# Patient Record
Sex: FEMALE | Race: WHITE | NOT HISPANIC OR LATINO | Employment: OTHER | URBAN - METROPOLITAN AREA
[De-identification: names, ages, dates, MRNs, and addresses within clinical notes are randomized per-mention and may not be internally consistent; named-entity substitution may affect disease eponyms.]

---

## 2022-05-21 ENCOUNTER — HOSPITAL ENCOUNTER (EMERGENCY)
Facility: HOSPITAL | Age: 87
Discharge: HOME | End: 2022-05-21
Attending: EMERGENCY MEDICINE | Admitting: EMERGENCY MEDICINE

## 2022-05-21 ENCOUNTER — APPOINTMENT (OUTPATIENT)
Dept: CT IMAGING | Facility: HOSPITAL | Age: 87
End: 2022-05-21

## 2022-05-21 VITALS
SYSTOLIC BLOOD PRESSURE: 145 MMHG | HEART RATE: 81 BPM | RESPIRATION RATE: 18 BRPM | HEIGHT: 61 IN | WEIGHT: 145 LBS | DIASTOLIC BLOOD PRESSURE: 71 MMHG | BODY MASS INDEX: 27.38 KG/M2 | OXYGEN SATURATION: 97 % | TEMPERATURE: 98.2 F

## 2022-05-21 DIAGNOSIS — S06.0X0A CONCUSSION WITHOUT LOSS OF CONSCIOUSNESS, INITIAL ENCOUNTER: Primary | ICD-10-CM

## 2022-05-21 DIAGNOSIS — N39.0 URINARY TRACT INFECTION WITHOUT HEMATURIA, SITE UNSPECIFIED: ICD-10-CM
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
## 2022-05-21 LAB
ANION GAP SERPL CALCULATED.3IONS-SCNC: 11 MMOL/L (ref 5–15)
BACTERIA UR QL AUTO: ABNORMAL /HPF
BASOPHILS # BLD AUTO: 0 10*3/MM3 (ref 0–0.2)
BASOPHILS NFR BLD AUTO: 1.2 % (ref 0–1.5)
BILIRUB UR QL STRIP: NEGATIVE
BUN SERPL-MCNC: 23 MG/DL (ref 8–23)
BUN/CREAT SERPL: 23.7 (ref 7–25)
CALCIUM SPEC-SCNC: 9.3 MG/DL (ref 8.2–9.6)
CHLORIDE SERPL-SCNC: 106 MMOL/L (ref 98–107)
CLARITY UR: CLEAR
CO2 SERPL-SCNC: 22 MMOL/L (ref 22–29)
COLOR UR: YELLOW
CREAT SERPL-MCNC: 0.97 MG/DL (ref 0.57–1)
DEPRECATED RDW RBC AUTO: 41.1 FL (ref 37–54)
EGFRCR SERPLBLD CKD-EPI 2021: 54.9 ML/MIN/1.73
EOSINOPHIL # BLD AUTO: 0.1 10*3/MM3 (ref 0–0.4)
EOSINOPHIL NFR BLD AUTO: 2 % (ref 0.3–6.2)
ERYTHROCYTE [DISTWIDTH] IN BLOOD BY AUTOMATED COUNT: 14.4 % (ref 12.3–15.4)
GLUCOSE SERPL-MCNC: 93 MG/DL (ref 65–99)
GLUCOSE UR STRIP-MCNC: NEGATIVE MG/DL
HCT VFR BLD AUTO: 38.8 % (ref 34–46.6)
HGB BLD-MCNC: 12.9 G/DL (ref 12–15.9)
HGB UR QL STRIP.AUTO: NEGATIVE
HYALINE CASTS UR QL AUTO: ABNORMAL /LPF
KETONES UR QL STRIP: NEGATIVE
LEUKOCYTE ESTERASE UR QL STRIP.AUTO: ABNORMAL
LYMPHOCYTES # BLD AUTO: 1 10*3/MM3 (ref 0.7–3.1)
LYMPHOCYTES NFR BLD AUTO: 26.7 % (ref 19.6–45.3)
MCH RBC QN AUTO: 27.1 PG (ref 26.6–33)
MCHC RBC AUTO-ENTMCNC: 33.3 G/DL (ref 31.5–35.7)
MCV RBC AUTO: 81.6 FL (ref 79–97)
MONOCYTES # BLD AUTO: 0.7 10*3/MM3 (ref 0.1–0.9)
MONOCYTES NFR BLD AUTO: 18.9 % (ref 5–12)
NEUTROPHILS NFR BLD AUTO: 1.9 10*3/MM3 (ref 1.7–7)
NEUTROPHILS NFR BLD AUTO: 51.2 % (ref 42.7–76)
NITRITE UR QL STRIP: NEGATIVE
NRBC BLD AUTO-RTO: 0.2 /100 WBC (ref 0–0.2)
PH UR STRIP.AUTO: 7 [PH] (ref 5–8)
PLATELET # BLD AUTO: 266 10*3/MM3 (ref 140–450)
PMV BLD AUTO: 6.7 FL (ref 6–12)
POTASSIUM SERPL-SCNC: 4.1 MMOL/L (ref 3.5–5.2)
PROT UR QL STRIP: NEGATIVE
RBC # BLD AUTO: 4.76 10*6/MM3 (ref 3.77–5.28)
RBC # UR STRIP: ABNORMAL /HPF
REF LAB TEST METHOD: ABNORMAL
SODIUM SERPL-SCNC: 139 MMOL/L (ref 136–145)
SP GR UR STRIP: 1.02 (ref 1–1.03)
SQUAMOUS #/AREA URNS HPF: ABNORMAL /HPF
UROBILINOGEN UR QL STRIP: ABNORMAL
WBC # UR STRIP: ABNORMAL /HPF
WBC NRBC COR # BLD: 3.7 10*3/MM3 (ref 3.4–10.8)

## 2022-05-21 PROCEDURE — 93005 ELECTROCARDIOGRAM TRACING: CPT | Performed by: EMERGENCY MEDICINE

## 2022-05-21 PROCEDURE — 99284 EMERGENCY DEPT VISIT MOD MDM: CPT

## 2022-05-21 PROCEDURE — 70450 CT HEAD/BRAIN W/O DYE: CPT

## 2022-05-21 PROCEDURE — 36415 COLL VENOUS BLD VENIPUNCTURE: CPT

## 2022-05-21 PROCEDURE — 85025 COMPLETE CBC W/AUTO DIFF WBC: CPT | Performed by: EMERGENCY MEDICINE

## 2022-05-21 PROCEDURE — 81001 URINALYSIS AUTO W/SCOPE: CPT | Performed by: EMERGENCY MEDICINE

## 2022-05-21 PROCEDURE — 80048 BASIC METABOLIC PNL TOTAL CA: CPT | Performed by: EMERGENCY MEDICINE

## 2022-05-21 RX ORDER — SODIUM CHLORIDE 0.9 % (FLUSH) 0.9 %
10 SYRINGE (ML) INJECTION AS NEEDED
Status: DISCONTINUED | OUTPATIENT
Start: 2022-05-21 | End: 2022-05-21 | Stop reason: HOSPADM

## 2022-05-21 RX ORDER — CEFDINIR 300 MG/1
300 CAPSULE ORAL 2 TIMES DAILY
Qty: 10 CAPSULE | Refills: 0 | Status: SHIPPED | OUTPATIENT
Start: 2022-05-21

## 2022-05-21 NOTE — ED PROVIDER NOTES
Subjective   Patient is a 89-year-old female who had a momentary episode of confusion per her family.  Patient also slipped and fell and struck her head this morning.  Family offers no complaints.  Patient is unable to offer further complaints due to dementia.          Review of Systems  Unable to obtain due to the patient's dementia  No past medical history on file.    No Known Allergies    No past surgical history on file.    No family history on file.    Social History     Socioeconomic History   • Marital status:            Objective   Physical Exam  Neurologic exam shows the patient be alert to person.  She has no focal dural neurologic deficits.  Patient is very pleasant and in no distress.  HEENT exam shows patient have contusion over her left occiput.  TMs are clear.  Oropharynx clear moist.  Neck has no adenopathy JVD or bruits.  Lungs are clear.  Heart has a regular rate rhythm without murmur rub or gallop.  Chest is nontender.  Abdomen is soft nontender.  Patient has normal bowel sounds without rebound or guarding.  Back has no CVA tenderness.  She does have no spinous tenderness.  Extremity exam is no swelling ecchymosis deformity or other abnormality.  She has 2+ pulses throughout.  Procedures     My EKG interpretation shows normal sinus rhythm at a rate of 63 with no acute ST change      ED Course      Results for orders placed or performed during the hospital encounter of 05/21/22   Basic Metabolic Panel    Specimen: Arm, Left; Blood   Result Value Ref Range    Glucose 93 65 - 99 mg/dL    BUN 23 8 - 23 mg/dL    Creatinine 0.97 0.57 - 1.00 mg/dL    Sodium 139 136 - 145 mmol/L    Potassium 4.1 3.5 - 5.2 mmol/L    Chloride 106 98 - 107 mmol/L    CO2 22.0 22.0 - 29.0 mmol/L    Calcium 9.3 8.2 - 9.6 mg/dL    BUN/Creatinine Ratio 23.7 7.0 - 25.0    Anion Gap 11.0 5.0 - 15.0 mmol/L    eGFR 54.9 (L) >60.0 mL/min/1.73   Urinalysis With Microscopic If Indicated (No Culture) - Urine, Clean Catch     Specimen: Urine, Clean Catch   Result Value Ref Range    Color, UA Yellow Yellow, Straw    Appearance, UA Clear Clear    pH, UA 7.0 5.0 - 8.0    Specific Gravity, UA 1.016 1.005 - 1.030    Glucose, UA Negative Negative    Ketones, UA Negative Negative    Bilirubin, UA Negative Negative    Blood, UA Negative Negative    Protein, UA Negative Negative    Leuk Esterase, UA Small (1+) (A) Negative    Nitrite, UA Negative Negative    Urobilinogen, UA 1.0 E.U./dL 0.2 - 1.0 E.U./dL   CBC Auto Differential    Specimen: Arm, Left; Blood   Result Value Ref Range    WBC 3.70 3.40 - 10.80 10*3/mm3    RBC 4.76 3.77 - 5.28 10*6/mm3    Hemoglobin 12.9 12.0 - 15.9 g/dL    Hematocrit 38.8 34.0 - 46.6 %    MCV 81.6 79.0 - 97.0 fL    MCH 27.1 26.6 - 33.0 pg    MCHC 33.3 31.5 - 35.7 g/dL    RDW 14.4 12.3 - 15.4 %    RDW-SD 41.1 37.0 - 54.0 fl    MPV 6.7 6.0 - 12.0 fL    Platelets 266 140 - 450 10*3/mm3    Neutrophil % 51.2 42.7 - 76.0 %    Lymphocyte % 26.7 19.6 - 45.3 %    Monocyte % 18.9 (H) 5.0 - 12.0 %    Eosinophil % 2.0 0.3 - 6.2 %    Basophil % 1.2 0.0 - 1.5 %    Neutrophils, Absolute 1.90 1.70 - 7.00 10*3/mm3    Lymphocytes, Absolute 1.00 0.70 - 3.10 10*3/mm3    Monocytes, Absolute 0.70 0.10 - 0.90 10*3/mm3    Eosinophils, Absolute 0.10 0.00 - 0.40 10*3/mm3    Basophils, Absolute 0.00 0.00 - 0.20 10*3/mm3    nRBC 0.2 0.0 - 0.2 /100 WBC   Urinalysis, Microscopic Only - Urine, Clean Catch    Specimen: Urine, Clean Catch   Result Value Ref Range    RBC, UA 3-5 (A) None Seen /HPF    WBC, UA 3-5 (A) None Seen /HPF    Bacteria, UA 1+ (A) None Seen /HPF    Squamous Epithelial Cells, UA 3-6 (A) None Seen, 0-2 /HPF    Hyaline Casts, UA None Seen None Seen /LPF    Methodology Automated Microscopy    ECG 12 Lead   Result Value Ref Range    QT Interval 429 ms     CT Head Without Contrast    Result Date: 5/21/2022  1. No intracranial hemorrhage. 2. Left posterior parietal scalp contusion/hematoma. No underlying fracture. 3. Cerebral atrophy  and white matter findings of chronic small vessel disease.  Electronically Signed By-Indra Medina MD On:5/21/2022 2:24 PM This report was finalized on [id number] by  Indra Medina MD.                                               MDM  Number of Diagnoses or Management Options  Diagnosis management comments: My interpretation of the patient CT scan of her head without contrast shows no acute intracranial abnormality and no skull fracture noted.  She does have findings consistent with UTI.  There is no metabolic abnormalities or other infectious process.  Patient will be discharged.  She will be placed on Omnicef.  She will follow with MD this week for recheck.       Amount and/or Complexity of Data Reviewed  Clinical lab tests: reviewed  Tests in the radiology section of CPT®: reviewed  Tests in the medicine section of CPT®: reviewed    Risk of Complications, Morbidity, and/or Mortality  Presenting problems: high  Diagnostic procedures: high  Management options: high    Patient Progress  Patient progress: stable      Final diagnoses:   Concussion without loss of consciousness, initial encounter   Fall, initial encounter   Urinary tract infection without hematuria, site unspecified       ED Disposition  ED Disposition     ED Disposition   Discharge    Condition   Stable    Comment   --             No follow-up provider specified.       Medication List      New Prescriptions    cefdinir 300 MG capsule  Commonly known as: OMNICEF  Take 1 capsule by mouth 2 (Two) Times a Day.           Where to Get Your Medications      Information about where to get these medications is not yet available    Ask your nurse or doctor about these medications  · cefdinir 300 MG capsule          Julien Knox MD  05/21/22 [id number]

## 2022-05-23 LAB — QT INTERVAL: 429 MS

## 2022-05-28 ENCOUNTER — HOSPITAL ENCOUNTER (EMERGENCY)
Facility: HOSPITAL | Age: 87
Discharge: HOME | End: 2022-05-28
Attending: EMERGENCY MEDICINE | Admitting: EMERGENCY MEDICINE

## 2022-05-28 ENCOUNTER — APPOINTMENT (OUTPATIENT)
Dept: CT IMAGING | Facility: HOSPITAL | Age: 87
End: 2022-05-28

## 2022-05-28 ENCOUNTER — APPOINTMENT (OUTPATIENT)
Dept: GENERAL RADIOLOGY | Facility: HOSPITAL | Age: 87
End: 2022-05-28

## 2022-05-28 VITALS
HEIGHT: 61 IN | RESPIRATION RATE: 16 BRPM | HEART RATE: 68 BPM | WEIGHT: 145 LBS | TEMPERATURE: 98.4 F | DIASTOLIC BLOOD PRESSURE: 74 MMHG | SYSTOLIC BLOOD PRESSURE: 162 MMHG | BODY MASS INDEX: 27.38 KG/M2 | OXYGEN SATURATION: 99 %

## 2022-05-28 DIAGNOSIS — S06.0X0A CONCUSSION WITHOUT LOSS OF CONSCIOUSNESS, INITIAL ENCOUNTER: ICD-10-CM

## 2022-05-28 DIAGNOSIS — S81.811A LACERATION OF RIGHT LOWER EXTREMITY, INITIAL ENCOUNTER: ICD-10-CM

## 2022-05-28 DIAGNOSIS — M54.2 NECK PAIN: ICD-10-CM

## 2022-05-28 DIAGNOSIS — M25.561 ACUTE PAIN OF RIGHT KNEE: ICD-10-CM
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
## 2022-05-28 DIAGNOSIS — S16.1XXA STRAIN OF NECK MUSCLE, INITIAL ENCOUNTER: ICD-10-CM

## 2022-05-28 DIAGNOSIS — S01.01XA LACERATION OF SCALP, INITIAL ENCOUNTER: ICD-10-CM

## 2022-05-28 DIAGNOSIS — S86.911A KNEE STRAIN, RIGHT, INITIAL ENCOUNTER: ICD-10-CM

## 2022-05-28 LAB
ALBUMIN SERPL-MCNC: 4.1 G/DL (ref 3.5–5.2)
ALBUMIN/GLOB SERPL: 1.2 G/DL
ALP SERPL-CCNC: 114 U/L (ref 39–117)
ALT SERPL W P-5'-P-CCNC: 11 U/L (ref 1–33)
ANION GAP SERPL CALCULATED.3IONS-SCNC: 13 MMOL/L (ref 5–15)
AST SERPL-CCNC: 21 U/L (ref 1–32)
BACTERIA UR QL AUTO: ABNORMAL /HPF
BILIRUB SERPL-MCNC: 0.4 MG/DL (ref 0–1.2)
BILIRUB UR QL STRIP: NEGATIVE
BUN SERPL-MCNC: 23 MG/DL (ref 8–23)
BUN/CREAT SERPL: 19.8 (ref 7–25)
CALCIUM SPEC-SCNC: 9.4 MG/DL (ref 8.2–9.6)
CHLORIDE SERPL-SCNC: 106 MMOL/L (ref 98–107)
CLARITY UR: ABNORMAL
CO2 SERPL-SCNC: 24 MMOL/L (ref 22–29)
COLOR UR: ABNORMAL
CREAT SERPL-MCNC: 1.16 MG/DL (ref 0.57–1)
DEPRECATED RDW RBC AUTO: 42.9 FL (ref 37–54)
EGFRCR SERPLBLD CKD-EPI 2021: 44.3 ML/MIN/1.73
ERYTHROCYTE [DISTWIDTH] IN BLOOD BY AUTOMATED COUNT: 14.8 % (ref 12.3–15.4)
GLOBULIN UR ELPH-MCNC: 3.3 GM/DL
GLUCOSE SERPL-MCNC: 93 MG/DL (ref 65–99)
GLUCOSE UR STRIP-MCNC: NEGATIVE MG/DL
HCT VFR BLD AUTO: 39.6 % (ref 34–46.6)
HGB BLD-MCNC: 13.1 G/DL (ref 12–15.9)
HGB UR QL STRIP.AUTO: ABNORMAL
HOLD SPECIMEN: NORMAL
HYALINE CASTS UR QL AUTO: ABNORMAL /LPF
KETONES UR QL STRIP: ABNORMAL
LEUKOCYTE ESTERASE UR QL STRIP.AUTO: ABNORMAL
LYMPHOCYTES # BLD MANUAL: 1.02 10*3/MM3 (ref 0.7–3.1)
LYMPHOCYTES NFR BLD MANUAL: 27 % (ref 5–12)
MAGNESIUM SERPL-MCNC: 2.2 MG/DL (ref 1.7–2.3)
MCH RBC QN AUTO: 27.4 PG (ref 26.6–33)
MCHC RBC AUTO-ENTMCNC: 33.2 G/DL (ref 31.5–35.7)
MCV RBC AUTO: 82.5 FL (ref 79–97)
MONOCYTES # BLD: 0.81 10*3/MM3 (ref 0.1–0.9)
NEUTROPHILS # BLD AUTO: 1.17 10*3/MM3 (ref 1.7–7)
NEUTROPHILS NFR BLD MANUAL: 36 % (ref 42.7–76)
NEUTS BAND NFR BLD MANUAL: 3 % (ref 0–5)
NITRITE UR QL STRIP: NEGATIVE
PH UR STRIP.AUTO: 7.5 [PH] (ref 5–8)
PLAT MORPH BLD: NORMAL
PLATELET # BLD AUTO: 311 10*3/MM3 (ref 140–450)
PMV BLD AUTO: 6.9 FL (ref 6–12)
POTASSIUM SERPL-SCNC: 4.2 MMOL/L (ref 3.5–5.2)
PROT SERPL-MCNC: 7.4 G/DL (ref 6–8.5)
PROT UR QL STRIP: ABNORMAL
RBC # BLD AUTO: 4.8 10*6/MM3 (ref 3.77–5.28)
RBC # UR STRIP: ABNORMAL /HPF
RBC MORPH BLD: NORMAL
REF LAB TEST METHOD: ABNORMAL
SCAN SLIDE: NORMAL
SODIUM SERPL-SCNC: 143 MMOL/L (ref 136–145)
SP GR UR STRIP: 1.01 (ref 1–1.03)
SQUAMOUS #/AREA URNS HPF: ABNORMAL /HPF
TROPONIN T SERPL-MCNC: <0.01 NG/ML (ref 0–0.03)
TSH SERPL DL<=0.05 MIU/L-ACNC: 4.71 UIU/ML (ref 0.27–4.2)
UROBILINOGEN UR QL STRIP: ABNORMAL
VARIANT LYMPHS NFR BLD MANUAL: 34 % (ref 19.6–45.3)
WBC # UR STRIP: ABNORMAL /HPF
WBC MORPH BLD: NORMAL
WBC NRBC COR # BLD: 3 10*3/MM3 (ref 3.4–10.8)
WHOLE BLOOD HOLD COAG: NORMAL

## 2022-05-28 PROCEDURE — 85025 COMPLETE CBC W/AUTO DIFF WBC: CPT | Performed by: PHYSICIAN ASSISTANT

## 2022-05-28 PROCEDURE — 81001 URINALYSIS AUTO W/SCOPE: CPT | Performed by: PHYSICIAN ASSISTANT

## 2022-05-28 PROCEDURE — 80053 COMPREHEN METABOLIC PANEL: CPT | Performed by: PHYSICIAN ASSISTANT

## 2022-05-28 PROCEDURE — 70450 CT HEAD/BRAIN W/O DYE: CPT

## 2022-05-28 PROCEDURE — 25010000002 TETANUS-DIPHTH-ACELL PERTUSSIS 5-2.5-18.5 LF-MCG/0.5 SUSPENSION PREFILLED SYRINGE: Performed by: PHYSICIAN ASSISTANT

## 2022-05-28 PROCEDURE — 73562 X-RAY EXAM OF KNEE 3: CPT

## 2022-05-28 PROCEDURE — 36415 COLL VENOUS BLD VENIPUNCTURE: CPT

## 2022-05-28 PROCEDURE — 93005 ELECTROCARDIOGRAM TRACING: CPT | Performed by: PHYSICIAN ASSISTANT

## 2022-05-28 PROCEDURE — 84443 ASSAY THYROID STIM HORMONE: CPT | Performed by: PHYSICIAN ASSISTANT

## 2022-05-28 PROCEDURE — 99283 EMERGENCY DEPT VISIT LOW MDM: CPT

## 2022-05-28 PROCEDURE — 84484 ASSAY OF TROPONIN QUANT: CPT | Performed by: PHYSICIAN ASSISTANT

## 2022-05-28 PROCEDURE — 71045 X-RAY EXAM CHEST 1 VIEW: CPT

## 2022-05-28 PROCEDURE — 90471 IMMUNIZATION ADMIN: CPT | Performed by: PHYSICIAN ASSISTANT

## 2022-05-28 PROCEDURE — 90715 TDAP VACCINE 7 YRS/> IM: CPT | Performed by: PHYSICIAN ASSISTANT

## 2022-05-28 PROCEDURE — 83735 ASSAY OF MAGNESIUM: CPT | Performed by: PHYSICIAN ASSISTANT

## 2022-05-28 PROCEDURE — 85007 BL SMEAR W/DIFF WBC COUNT: CPT | Performed by: PHYSICIAN ASSISTANT

## 2022-05-28 PROCEDURE — 72125 CT NECK SPINE W/O DYE: CPT

## 2022-05-28 RX ORDER — SODIUM CHLORIDE 0.9 % (FLUSH) 0.9 %
10 SYRINGE (ML) INJECTION AS NEEDED
Status: DISCONTINUED | OUTPATIENT
Start: 2022-05-28 | End: 2022-05-28 | Stop reason: HOSPADM

## 2022-05-28 RX ORDER — TRAMADOL HYDROCHLORIDE 50 MG/1
50 TABLET ORAL EVERY 12 HOURS
Qty: 6 TABLET | Refills: 0 | Status: SHIPPED | OUTPATIENT
Start: 2022-05-28

## 2022-05-28 RX ADMIN — TETANUS TOXOID, REDUCED DIPHTHERIA TOXOID AND ACELLULAR PERTUSSIS VACCINE, ADSORBED 0.5 ML: 5; 2.5; 8; 8; 2.5 SUSPENSION INTRAMUSCULAR at 18:27

## 2022-05-28 NOTE — ED PROVIDER NOTES
Subjective   Joann Enriquez is a 89 y/o female presenting to the ED accompanied by multiple family members for right knee pain since falling at 09:00 today. Per family, patient was on the phone with a family member, hung up the phone, and they suspect she fell while trying to get off the couch. Patient lives by herself at home, and the fall was unwitnessed.  Patient does have a history of dementia at baseline neurologically per family. another family member went to the patient's house and found her in the bathroom cleaning a wound on her right knee, and patient said she fell near the couch. She reports hitting the back of her head and right knee and has wounds to both areas.  Patient denies being lightheaded or dizziness prior to the fall she is not sure how she fell.  Patient states she was maybe a little lightheaded earlier but denies any currently.  Patient does have a history of lightheadedness and states that this not new.  No one-sided numbness weakness slurred speech or facial droop. Denies chest pain, shortness of breath, or fever. Patient was seen here 1 week ago for fall, and family is concerned due to increase in falls. She had a previous workup that was remarkable only for UTI, and patient finished course of cefdinir yesterday. They were told she had a concussion, and her head CT was unremarkable. Family reports that they check on her at home frequently. Patient is not on blood thinners.  Denies any personal cardiac history patient currently denies any urinary symptoms including dysuria or hematuria.      History provided by:  Patient and relative      Review of Systems   Constitutional: Negative.    HENT: Negative.    Eyes: Negative for photophobia, pain, discharge, redness and itching.   Cardiovascular: Negative.    Gastrointestinal: Negative for abdominal distention, abdominal pain, nausea and vomiting.   Genitourinary: Negative for decreased urine volume, difficulty urinating, dysuria, flank pain,  frequency, hematuria and urgency.   Musculoskeletal: Positive for arthralgias and neck pain. Negative for back pain and neck stiffness.   Skin: Positive for wound.   Neurological: Positive for light-headedness. Negative for dizziness, tremors, seizures, syncope, facial asymmetry, speech difficulty, weakness, numbness and headaches.   Hematological: Negative.        No past medical history on file.    No Known Allergies    No past surgical history on file.    No family history on file.    Social History     Socioeconomic History   • Marital status:            Objective   Physical Exam  Vitals and nursing note reviewed.   Constitutional:       General: She is not in acute distress.     Appearance: Normal appearance. She is well-developed. She is not ill-appearing, toxic-appearing or diaphoretic.   HENT:      Head: Normocephalic. No raccoon eyes or Henley's sign.        Nose:      Right Nostril: No epistaxis or septal hematoma.      Left Nostril: No epistaxis or septal hematoma.      Mouth/Throat:      Lips: No lesions.      Mouth: Mucous membranes are moist. No lacerations.      Dentition: No dental tenderness.      Pharynx: Oropharynx is clear. Uvula midline.   Eyes:      General: No scleral icterus.     Extraocular Movements: Extraocular movements intact.      Pupils: Pupils are equal, round, and reactive to light.   Neck:      Trachea: Trachea and phonation normal.   Cardiovascular:      Rate and Rhythm: Normal rate and regular rhythm.      Heart sounds: No murmur heard.    No friction rub. No gallop.   Pulmonary:      Effort: Pulmonary effort is normal. No respiratory distress.      Breath sounds: Normal breath sounds. No stridor. No wheezing, rhonchi or rales.   Chest:      Chest wall: No swelling or tenderness.   Abdominal:      General: Bowel sounds are normal. There is no distension. There are no signs of injury.      Palpations: Abdomen is soft.      Tenderness: There is no abdominal tenderness. There  is no guarding or rebound.      Hernia: No hernia is present.   Musculoskeletal:      Cervical back: Muscular tenderness present. No pain with movement or spinous process tenderness. Normal range of motion.      Thoracic back: Normal.      Lumbar back: Normal.      Right hip: Normal.      Right upper leg: Normal.      Right knee: Ecchymosis and laceration present. Normal range of motion. Tenderness present.      Left knee: Normal.      Right lower leg: Normal.      Left lower leg: Normal.      Right ankle: Normal.      Right Achilles Tendon: Normal.      Left ankle: Normal.      Left Achilles Tendon: Normal.      Right foot: Normal.      Left foot: Normal.        Legs:       Comments: Right knee: Superficial linear laceration as noted just distal to the knee.  Above normal range of motion is present with flexion extension with no tenderness noted throughout.  She has some surrounding swelling and ecchymosis noted near laceration.  Normal straight leg raise.  Normal joint laxity with varus and valgus stress.  Peripheral pulses are intact compartments are soft.  Normal sensation throughout right lower extremity.   Skin:     General: Skin is warm.      Capillary Refill: Capillary refill takes less than 2 seconds.      Coloration: Skin is not cyanotic, jaundiced or pale.      Findings: No rash.   Neurological:      General: No focal deficit present.      Mental Status: She is alert and oriented to person, place, and time.   Psychiatric:         Mood and Affect: Mood normal.         Behavior: Behavior normal.         Laceration Repair    Date/Time: 5/28/2022 6:19 PM  Performed by: July Pate PA  Authorized by: Wagner Farley MD     Consent:     Consent obtained:  Emergent situation    Consent given by:  Patient    Risks, benefits, and alternatives were discussed: yes      Risks discussed:  Infection, pain, retained foreign body, need for additional repair, poor cosmetic result and poor wound healing     Alternatives discussed:  No treatment  Universal protocol:     Procedure explained and questions answered to patient or proxy's satisfaction: yes      Immediately prior to procedure, a time out was called: yes      Patient identity confirmed:  Verbally with patient and arm band  Anesthesia:     Anesthesia method:  Local infiltration    Local anesthetic:  Lidocaine 2% w/o epi  Laceration details:     Location:  Leg    Leg location:  R lower leg    Length (cm):  2  Pre-procedure details:     Preparation:  Patient was prepped and draped in usual sterile fashion and imaging obtained to evaluate for foreign bodies  Exploration:     Imaging outcome: foreign body not noted      Wound exploration: wound explored through full range of motion and entire depth of wound visualized      Wound extent: no foreign bodies/material noted, no muscle damage noted, no nerve damage noted, no tendon damage noted and no underlying fracture noted      Contaminated: no    Treatment:     Area cleansed with:  Soap and water and saline    Amount of cleaning:  Standard    Visualized foreign bodies/material removed: no    Skin repair:     Repair method:  Sutures    Suture size:  4-0    Suture material:  Nylon    Suture technique:  Simple interrupted    Number of sutures:  5  Repair type:     Repair type:  Simple  Post-procedure details:     Dressing:  Antibiotic ointment and adhesive bandage    Procedure completion:  Tolerated well, no immediate complications  Laceration Repair    Date/Time: 5/28/2022 6:20 PM  Performed by: July Pate PA  Authorized by: Wagner Farley MD     Consent:     Consent obtained:  Verbal    Consent given by:  Patient    Risks, benefits, and alternatives were discussed: yes      Risks discussed:  Infection, retained foreign body, pain, need for additional repair, poor cosmetic result and poor wound healing    Alternatives discussed:  No treatment  Universal protocol:     Procedure explained and questions  "answered to patient or proxy's satisfaction: yes      Immediately prior to procedure, a time out was called: yes      Patient identity confirmed:  Verbally with patient and arm band  Anesthesia:     Anesthesia method:  None  Laceration details:     Location:  Scalp    Scalp location:  Occipital    Length (cm):  0.4  Pre-procedure details:     Preparation:  Patient was prepped and draped in usual sterile fashion and imaging obtained to evaluate for foreign bodies  Treatment:     Area cleansed with:  Saline    Amount of cleaning:  Standard    Visualized foreign bodies/material removed: no    Skin repair:     Repair method:  Staples    Number of staples:  1  Post-procedure details:     Dressing:  Open (no dressing)    Procedure completion:  Tolerated well, no immediate complications               ED Course      /74 (BP Location: Right arm, Patient Position: Sitting)   Pulse 68   Temp 98.4 °F (36.9 °C) (Oral)   Resp 16   Ht 154.9 cm (61\")   Wt 65.8 kg (145 lb)   SpO2 99%   BMI 27.40 kg/m²   Medications   Tetanus-Diphth-Acell Pertussis (BOOSTRIX) injection 0.5 mL (0.5 mL Intramuscular Given 5/28/22 1827)     Labs Reviewed   COMPREHENSIVE METABOLIC PANEL - Abnormal; Notable for the following components:       Result Value    Creatinine 1.16 (*)     eGFR 44.3 (*)     All other components within normal limits    Narrative:     GFR Normal >60  Chronic Kidney Disease <60  Kidney Failure <15     TSH - Abnormal; Notable for the following components:    TSH 4.710 (*)     All other components within normal limits   CBC WITH AUTO DIFFERENTIAL - Abnormal; Notable for the following components:    WBC 3.00 (*)     All other components within normal limits    Narrative:     The previously reported component NRBC is no longer being reported. Previous result was 0.1 /100 WBC (Reference Range: 0.0-0.2 /100 WBC) on 5/28/2022 at 1605 EDT.   URINALYSIS W/ CULTURE IF INDICATED - Abnormal; Notable for the following components:    " Color, UA Dark Yellow (*)     Appearance, UA Turbid (*)     Ketones, UA Trace (*)     Blood, UA Large (3+) (*)     Protein, UA 30 mg/dL (1+) (*)     Leuk Esterase, UA Trace (*)     All other components within normal limits    Narrative:     In absence of clinical symptoms, the presence of pyuria, bacteria, and/or nitrites on the urinalysis result does not correlate with infection.   MANUAL DIFFERENTIAL - Abnormal; Notable for the following components:    Neutrophil % 36.0 (*)     Monocyte % 27.0 (*)     Neutrophils Absolute 1.17 (*)     All other components within normal limits   URINALYSIS, MICROSCOPIC ONLY - Abnormal; Notable for the following components:    RBC, UA Too Numerous to Count (*)     WBC, UA 0-2 (*)     All other components within normal limits   TROPONIN (IN-HOUSE) - Normal    Narrative:     Troponin T Reference Range:  <= 0.03 ng/mL-   Negative for AMI  >0.03 ng/mL-     Abnormal for myocardial necrosis.  Clinicians would have to utilize clinical acumen, EKG, Troponin and serial changes to determine if it is an Acute Myocardial Infarction or myocardial injury due to an underlying chronic condition.       Results may be falsely decreased if patient taking Biotin.     MAGNESIUM - Normal   RAINBOW DRAW    Narrative:     The following orders were created for panel order [place] Draw.  Procedure                               Abnormality         Status                     ---------                               -----------         ------                     Gold Top - [facility][[id number]]                                   Final result               Light Blue Top[[id number]]                                   Final result                 Please view results for these tests on the individual orders.   SCAN SLIDE   CBC AND DIFFERENTIAL    Narrative:     The following orders were created for panel order CBC & Differential.  Procedure                               Abnormality         Status                      ---------                               -----------         ------                     CBC Auto Differential[[id number]]        Abnormal            Final result               Scan Slide[[id number]]                                       Final result                 Please view results for these tests on the individual orders.   GOLD TOP - SST   LIGHT BLUE TOP     XR Knee 3 View Right    Result Date: 5/28/2022  1. Soft tissue laceration anterior to the proximal tibia. 2. Negative for fracture or dislocation. 3. Mild degenerative arthritis.  Electronically Signed By-Kasey Hallman MD On:5/28/2022 4:21 PM This report was finalized on [id number] by  Kasey Hallman MD.    CT Head Without Contrast    Result Date: 5/28/2022  1. Negative for intracranial injury or skull fracture. 2. Stable appearance of the brain. There is stable cerebral atrophy and chronic white matter disease. 3. There is gas in the soft tissues inferior to the occipital bones. 3. Chronic frontal, ethmoid, and right maxillary sinusitis.  Electronically Signed By-Kasey Hallman MD On:5/28/2022 4:46 PM This report was finalized on [id number] by  Kasey Hallman MD.    CT Cervical Spine Without Contrast    Result Date: 5/28/2022  1. No evidence of cervical spine fracture. 2. There is gas in the soft tissues which begins inferior to the occipital bone and extends down to the C6 level. Most of this is deep to the musculature, not in the subcutaneous fat.. Clinical correlation recommended in regard to laceration. No associated fluid collection. 3. There is multilevel degenerative disc and facet joint disease. Please see above for detail.  Electronically Signed By-Kasey Hallman MD On:5/28/2022 4:54 PM This report was finalized on [id number] by  Kasey Hallman MD.    XR Chest 1 View    Result Date: 5/28/2022  1. Mild cardiomegaly. No evidence of active lung disease.  Electronically Signed By-Kasey Hallman MD On:5/28/2022 4:20 PM This report was finalized on  [id number] by  Kasey Hallman MD.                                               MDM  Number of Diagnoses or Management Options  Acute pain of right knee  Concussion without loss of consciousness, initial encounter  Fall, initial encounter  Knee strain, right, initial encounter  Laceration of right lower extremity, initial encounter  Laceration of scalp, initial encounter  Neck pain  Strain of neck muscle, initial encounter  Diagnosis management comments: Chart Review:  Comorbidity: As per past medical history  Differentials: Skull fracture brain bleed electrolyte abnormality UTI contusion concussion laceration  ;this list is not all inclusive and does not constitute the entirety of considered causes  ECG: Reviewed by myself interpreted by Dr. Farley shows sinus rhythm rate 63 when compared to previous EKG from 5/21/2022 prolonged FL interval no longer present  Labs:as above   Imaging: Was interpreted by physician and reviewed by myself:  CT Head Without Contrast   Final Result    1. Negative for intracranial injury or skull fracture.    2. Stable appearance of the brain. There is stable cerebral atrophy and    chronic white matter disease.    3. There is gas in the soft tissues inferior to the occipital bones.    3. Chronic frontal, ethmoid, and right maxillary sinusitis.         Electronically Signed By-Kasey Hallman MD On:5/28/2022 4:46 PM    This report was finalized on [id number] by  Kasey Hallman MD.     CT Cervical Spine Without Contrast   Final Result    1. No evidence of cervical spine fracture.    2. There is gas in the soft tissues which begins inferior to the    occipital bone and extends down to the C6 level. Most of this is deep to    the musculature, not in the subcutaneous fat.. Clinical correlation    recommended in regard to laceration. No associated fluid collection.    3. There is multilevel degenerative disc and facet joint disease. Please    see above for detail.         Electronically  Signed By-Kasey Hallman MD On:5/28/2022 4:54 PM    This report was finalized on [id number] by  Kasey Hallman MD.     XR Chest 1 View   Final Result    1. Mild cardiomegaly. No evidence of active lung disease.    Electronically Signed By-Kasey Hallman MD On:5/28/2022 4:20 PM    This report was finalized on [id number] by  Kasey Hallman MD.     XR Knee 3 View Right   Final Result    1. Soft tissue laceration anterior to the proximal tibia.    2. Negative for fracture or dislocation.    3. Mild degenerative arthritis.         Electronically Signed By-Kasey Hallman MD On:5/28/2022 4:21 PM    This report was finalized on [id number] by  Kasey Hallman MD.     Disposition/Treatment:  Appropriate PPE was worn during exam and throughout all encounters with the patient.  While in the ED IV was placed and labs were obtained patient was placed on proper monitor she was afebrile.  Nontoxic showed no acute cranial focal neurodeficits.  Patient had lacerations repaired as above and tolerated well.  X-ray of right knee showed no acute osseous abnormalities or foreign bodies.  Patient's tetanus vaccination was updated while in the ED.  Patient reports her fall was a mechanical fall continues to deny any lightheadedness or dizziness while in the ED. Lab results showed no signs of severe infection or electrolyte abnormality.  Repeat urinalysis today unremarkable for UTI.  Imaging showed no acute intracranial abnormalities or cervical spine fractures abnormalities chronic findings as above.  Chest x-ray showed mild cardiomegaly but no signs of pneumonia.  At this time patient is feeling well and would like to be discharged.  Patient was offered admission for possible PT and OT evaluation as this is the second time she has fallen this week patient and family at bedside states that they would rather her be discharged and she can follow-up with her PCP.  Patient will be going family's house to stay.  She was given wound care  instructions along with head injury instructions and signs and symptoms to return to the ED patient and family were in agreement with plan of discharge.       Amount and/or Complexity of Data Reviewed  Clinical lab tests: reviewed  Tests in the radiology section of CPT®: reviewed  Tests in the medicine section of CPT®: reviewed        Final diagnoses:   Fall, initial encounter   Laceration of scalp, initial encounter   Concussion without loss of consciousness, initial encounter   Neck pain   Strain of neck muscle, initial encounter   Laceration of right lower extremity, initial encounter   Acute pain of right knee   Knee strain, right, initial encounter       ED Disposition  ED Disposition     ED Disposition   Discharge    Condition   Stable    Comment   --             [facility] EMERGENCY DEPARTMENT  [address] [facility] [id number] [phone]  Go to   If symptoms worsen    PATIENT CONNECTION - [facility] [id number] [phone]  Call   If you do not have a primary care provider    Connor Guillory MD  [address] [place] [address] [phone]    Call   As needed if your knee pain continues         Medication List      New Prescriptions    traMADol 50 MG tablet  Commonly known as: ULTRAM  Take 1 tablet by mouth Every 12 (Twelve) Hours.           Where to Get Your Medications      These medications were sent to [id number] - [facility] [place] - [address] [facility] AT [facility] & [address] - [phone]  - [phone]   [address] [place] [address]    Phone: [phone]   · traMADol 50 MG tablet          July Pate PA  05/29/22 1043

## 2022-05-28 NOTE — DISCHARGE INSTRUCTIONS
Keep the wound clean with soap and water daily.  Apply antibiotic ointment daily.  Sutures to be removed in 10 days, scheduled appointment with your primary care provider to have this done, if you do not have a primary care provider any Urgent Care or immediate Care can remove sutures.     Apply ice and elevate your right knee for 20 minutes at a time for the next 72 hours help with pain and swelling.  If your knee pain continues follow-up with orthopedist as listed below    Your scalp staples should be removed in 7 days.  Follow wound care as above    Get head injury instructions; keep head elevated, apply cool compresses as tolerated; activity as tolerated, avoid large meals or excessive fluid intake for the next 12 hours; follow up with your primary care physician in office for continued evaluation; Tylenol as needed for pain; return to ED for any new concerns or changes including behavioral cognition changes, imbalance, vomiting, any increased swelling, inability of eyes to focus or other changes.    Follow-up with your primary care provider in 3-5 days.  If you do not have a primary care provider call [phone] for help in finding one, or you may follow up with [facility] at [phone].    Take tramadol as needed for pain.  Do not operate heavy machinery or drink alcohol while taking this medication.  Be aware this medication may cause drowsiness and constipation.  You may use an over-the-counter stool softener and increase your water intake to help with constipation.

## 2022-06-02 LAB — QT INTERVAL: 432 MS
